# Patient Record
Sex: FEMALE | Race: NATIVE HAWAIIAN OR OTHER PACIFIC ISLANDER
[De-identification: names, ages, dates, MRNs, and addresses within clinical notes are randomized per-mention and may not be internally consistent; named-entity substitution may affect disease eponyms.]

---

## 2017-03-04 ENCOUNTER — HOSPITAL ENCOUNTER (EMERGENCY)
Dept: HOSPITAL 67 - ED | Age: 47
Discharge: HOME | End: 2017-03-04
Payer: COMMERCIAL

## 2017-03-04 VITALS — TEMPERATURE: 98 F

## 2017-03-04 VITALS — WEIGHT: 250 LBS | BODY MASS INDEX: 41.65 KG/M2 | HEIGHT: 65 IN

## 2017-03-04 VITALS — SYSTOLIC BLOOD PRESSURE: 158 MMHG | DIASTOLIC BLOOD PRESSURE: 88 MMHG

## 2017-03-04 DIAGNOSIS — R56.9: Primary | ICD-10-CM

## 2017-03-04 DIAGNOSIS — F12.10: ICD-10-CM

## 2017-03-04 DIAGNOSIS — F19.10: ICD-10-CM

## 2017-03-04 LAB
PLATELET # BLD: 271 K/UL (ref 152–353)
POTASSIUM SERPL-SCNC: 3.3 MMOL/L (ref 3.6–5.2)
SODIUM SERPL-SCNC: 137 MMOL/L (ref 136–145)

## 2017-03-04 PROCEDURE — 36415 COLL VENOUS BLD VENIPUNCTURE: CPT

## 2017-03-04 PROCEDURE — 81000 URINALYSIS NONAUTO W/SCOPE: CPT

## 2017-03-04 PROCEDURE — 99284 EMERGENCY DEPT VISIT MOD MDM: CPT

## 2017-03-04 PROCEDURE — 96374 THER/PROPH/DIAG INJ IV PUSH: CPT

## 2017-03-04 PROCEDURE — 96361 HYDRATE IV INFUSION ADD-ON: CPT

## 2017-03-04 PROCEDURE — 96376 TX/PRO/DX INJ SAME DRUG ADON: CPT

## 2017-03-04 PROCEDURE — 85027 COMPLETE CBC AUTOMATED: CPT

## 2017-03-04 PROCEDURE — 80053 COMPREHEN METABOLIC PANEL: CPT

## 2017-03-04 PROCEDURE — G0479 DRUG TEST PRESUMP NOT OPT: HCPCS

## 2017-03-04 PROCEDURE — 80307 DRUG TEST PRSMV CHEM ANLYZR: CPT

## 2017-09-08 ENCOUNTER — HOSPITAL ENCOUNTER (EMERGENCY)
Dept: HOSPITAL 24 - ER | Age: 47
Discharge: HOME | End: 2017-09-08
Payer: COMMERCIAL

## 2017-09-08 ENCOUNTER — HOSPITAL ENCOUNTER (OUTPATIENT)
Dept: HOSPITAL 67 - AMB | Age: 47
Discharge: ANOTHER HOSPITAL | End: 2017-09-08
Payer: COMMERCIAL

## 2017-09-08 VITALS — BODY MASS INDEX: 35.5 KG/M2

## 2017-09-08 VITALS — DIASTOLIC BLOOD PRESSURE: 78 MMHG | SYSTOLIC BLOOD PRESSURE: 175 MMHG

## 2017-09-08 DIAGNOSIS — R41.82: Primary | ICD-10-CM

## 2017-09-08 DIAGNOSIS — G43.909: Primary | ICD-10-CM

## 2017-09-08 LAB
ALBUMIN SERPL BCP-MCNC: 3.8 G/DL (ref 3.4–5)
ALP SERPL-CCNC: 112 UNITS/L (ref 46–116)
ALT SERPL W P-5'-P-CCNC: 24 UNITS/L (ref 12–78)
AMORPH SED URNS QL MICRO: (no result) /HPF
AST SERPL W P-5'-P-CCNC: 18 UNITS/L (ref 15–37)
BACTERIA URNS QL MICRO: NEGATIVE /HPF
BASOPHILS # BLD AUTO: 0.1 X10^3/UL (ref 0–0.1)
BASOPHILS NFR BLD AUTO: 0.7 % (ref 0.2–1)
BILIRUB UR QL STRIP.AUTO: NEGATIVE
BUN SERPL-MCNC: 11 MG/DL (ref 7–18)
CALCIUM ALBUM COR SERPL-SCNC: (no result) MG/DL (ref 8.5–10.1)
CALCIUM ALBUM COR SERPL-SCNC: 144 MMOL/L (ref 136–145)
CALCIUM SERPL-MCNC: 8.1 MG/DL (ref 8.5–10.1)
CHLORIDE SERPL-SCNC: 104 MMOL/L (ref 98–107)
CLARITY UR: CLEAR
CO2 SERPL-SCNC: 29 MMOL/L (ref 21–32)
COLOR UR AUTO: (no result)
CREAT SERPL-MCNC: 0.81 MG/DL (ref 0.55–1.02)
EGFR  BLACK RACES: > 60 (ref 60–?)
EOSINOPHIL # BLD AUTO: 0.3 X10^3/UL (ref 0–0.2)
EOSINOPHIL NFR BLD AUTO: 2.6 % (ref 0.9–2.9)
ERYTHROCYTE [DISTWIDTH] IN BLOOD BY AUTOMATED COUNT: 13.8 % (ref 11.6–16.5)
GLUCOSE UR QL STRIP.AUTO: (no result)
HCT VFR BLD AUTO: 47.3 % (ref 36–47)
HGB BLD-MCNC: 15.9 G/DL (ref 12–16)
KETONES UR QL STRIP.AUTO: NEGATIVE
LEUKOCYTE ESTERASE UR QL STRIP.AUTO: NEGATIVE
LYMPHOCYTES # BLD AUTO: 2.7 X10^3/UL (ref 1.3–2.9)
LYMPHOCYTES NFR BLD AUTO: 23.5 % (ref 21–51)
MCH RBC QN AUTO: 30.3 PG (ref 27–34)
MCHC RBC AUTO-ENTMCNC: 33.6 G/DL (ref 33–35)
MCV RBC AUTO: 90.1 FL (ref 80–100)
MONOCYTES # BLD AUTO: 0.4 X10^3/UL (ref 0.3–0.8)
MONOCYTES NFR BLD AUTO: 3.8 % (ref 0–13)
NEUTROPHILS # BLD AUTO: 7.9 X10^3/UL (ref 2.2–4.8)
NEUTROPHILS NFR BLD AUTO: 69.4 % (ref 42–75)
NITRITE UR QL STRIP.AUTO: NEGATIVE
PH UR STRIP.AUTO: 6.5 [PH] (ref 5–8)
PLATELET # BLD: 238 X10^3/UL (ref 150–450)
PMV BLD AUTO: 8.8 FL (ref 7.4–11)
PROT SERPL-MCNC: 8 G/DL (ref 6.4–8.2)
PROT UR QL STRIP.AUTO: (no result)
RBC # BLD AUTO: 5.25 X10^6/UL (ref 3.5–5.4)
RBC # UR STRIP.AUTO: (no result) /UL
RBC #/AREA URNS HPF: (no result) /HPF
SODIUM SERPL-SCNC: 142 MMOL/L (ref 136–145)
SP GR UR STRIP.AUTO: 1.01 (ref 1–1.03)
SQUAMOUS URNS QL MICRO: (no result) /HPF
UROBILINOGEN UR QL STRIP.AUTO: NORMAL
WBC NRBC COR # BLD AUTO: 11.4 X10^3/UL (ref 3.6–10)

## 2017-09-08 PROCEDURE — 96365 THER/PROPH/DIAG IV INF INIT: CPT

## 2017-09-08 PROCEDURE — A0425 GROUND MILEAGE: HCPCS

## 2017-09-08 PROCEDURE — 80053 COMPREHEN METABOLIC PANEL: CPT

## 2017-09-08 PROCEDURE — S0078 INJECTION, FOSPHENYTOIN SODI: HCPCS

## 2017-09-08 PROCEDURE — A0427 ALS1-EMERGENCY: HCPCS

## 2017-09-08 PROCEDURE — 36415 COLL VENOUS BLD VENIPUNCTURE: CPT

## 2017-09-08 PROCEDURE — 96375 TX/PRO/DX INJ NEW DRUG ADDON: CPT

## 2017-09-08 PROCEDURE — 51702 INSERT TEMP BLADDER CATH: CPT

## 2017-09-08 PROCEDURE — 80185 ASSAY OF PHENYTOIN TOTAL: CPT

## 2017-09-08 PROCEDURE — 93041 RHYTHM ECG TRACING: CPT

## 2017-09-08 PROCEDURE — 85025 COMPLETE CBC W/AUTO DIFF WBC: CPT

## 2017-09-08 PROCEDURE — 81001 URINALYSIS AUTO W/SCOPE: CPT

## 2017-09-08 PROCEDURE — 70450 CT HEAD/BRAIN W/O DYE: CPT

## 2017-09-08 PROCEDURE — 99283 EMERGENCY DEPT VISIT LOW MDM: CPT

## 2017-09-08 PROCEDURE — 96374 THER/PROPH/DIAG INJ IV PUSH: CPT

## 2017-09-08 NOTE — DR.SEIZA
HPI





- Time Seen


Time seen: 11:00





- Complaints


Chief Complaint Doctors Comments: Patient presented to the ED via EMS with 

complaint of seizure.  She had a tonic clonic seizure duration of less than one 

minute.  She received versed x2.Upon arrival patient was post-ictal.  Patient 

had another seizure duration of three minutes, tonic/clonic , received 1mg 

lorazepam and seizure stopped. While awaiting for information for registering 

patient she had another seizure and she was started on cerebyx 1gm. Family 

member reports that seizures started 1-2 years ago, no regular physician, last 

seizure one month ago.





PMH





- PMH


Past Medical History: Hypertension, Seizures


Past Surgical History: No





- Family History


Family Medical History: Diabetes Mellitus, Heart Failure, Hypertension





- Social History


Do you use any recreational Drugs:: No





ROS





- Review of Systems


Eyes: No Symptoms Reported


ENTM: No Symptoms Reported


Respiratoy: No Symptoms Reported, Other (post-ictal)


Cardiovascular: No Symptoms Reported


Gastrointestinal/Abdominal: No Symptoms Reported


Genitourinary: No Symptoms Reported


Neurological: Seizure


Musculoskeletal: No Symptoms Reported


Integumentary: No Symptoms Reported


Hematologic/Lymphatic: No Symptoms Reported


Endocrine: No Symptoms Reported


Psychiatric: No Symptoms Reported


All Other Systems: Reviewed and Negative





PE





- Vital Signs


Vitals: 


 





Pulse Rate [Right Radial]        87


Pulse Rate                       118


Respiratory Rate                 26


Blood Pressure [Right Arm]       175/78


Blood Pressure                   253/124


O2 Sat by Pulse Oximetry         96











- Head


Head Exam: Normal Inspection





- Eyes


Eye exam: Normal Appearance





- ENT


ENT Exam: Normal Exam


Mouth Exam: Normal Inspection





- Neck


Neck Exam: Normal Inspection, Full ROM





- Chest


Chest Inspection: Normal Inspection





- Respiratory


Respiratory Exam: Normal Lung Sounds Bilat


Respiratory Exam: Bilateral Clear to Auscultation





- Cardiovascular


Cardiovascular Exam: Regular Rate, Normal Rhythm





- Abdominal Exam


Abdominal Exam: Normal Inspection


Abdominal Tenderness: negative: RUQ, RLQ, LUQ, LLQ, Epigastrium, Suprapubic, 

Diffuse, Mild, Moderate, Severe, Other





- Extremities


Extremities Exam: Normal Inspection





- Back


Back Exam: Normal Inspection, Full ROM





- Neurologic


Neurological Exam: Alert, Oriented X3, CN II-XII Intact





- Psychiatric


Psychiatric Exam: Depressed





- Skin


Skin Exam: Warm, Dry, Intact





ROR





- Labs Reviewed


Result Diagrams: 


 09/08/17 11:37





 09/08/17 11:37


Laboratory: 


 











WBC  11.4 X10^3/uL (3.6-10.0)  H  09/08/17  11:37    


 


RBC  5.25 X10^6/uL (3.5-5.4)   09/08/17  11:37    


 


Hgb  15.9 g/dL (12.0-16.0)   09/08/17  11:37    


 


Hct  47.3 % (36.0-47.0)  H  09/08/17  11:37    


 


MCV  90.1 fL (80.0-100.0)   09/08/17  11:37    


 


MCH  30.3 pg (27.0-34.0)   09/08/17  11:37    


 


MCHC  33.6 g/dL (33.0-35.0)   09/08/17  11:37    


 


RDW  13.8 % (11.6-16.5)   09/08/17  11:37    


 


Plt Count  238 X10^3/uL (150.0-450.0)   09/08/17  11:37    


 


MPV  8.8 fL (7.4-11.0)   09/08/17  11:37    


 


Neut %  69.4 % (42.0-75.0)   09/08/17  11:37    


 


Lymph %  23.5 % (21.0-51.0)   09/08/17  11:37    


 


Mono %  3.8 % (0.0-13.0)   09/08/17  11:37    


 


Eos %  2.6 % (0.9-2.9)   09/08/17  11:37    


 


Baso %  0.7 % (0.2-1.0)   09/08/17  11:37    


 


Neut #  7.9 x10^3/uL (2.2-4.8)  H  09/08/17  11:37    


 


Lymph #  2.7 X10^3/uL (1.3-2.9)   09/08/17  11:37    


 


Mono #  0.4 x10^3/uL (0.3-0.8)   09/08/17  11:37    


 


Eos #  0.3 x10^3/uL (0.0-0.2)  H  09/08/17  11:37    


 


Baso #  0.1 X10^3/uL (0.0-0.1)   09/08/17  11:37    


 


Absolute Nucleated RBC  0.1 /100WBC  09/08/17  11:37    


 


Sodium  142 mmol/L (136-145)   09/08/17  11:37    


 


Corrected Sodium  144 mmol/L (136-145)   09/08/17  11:37    


 


Potassium  3.9 mmol/L (3.5-5.1)   09/08/17  11:37    


 


Chloride  104 mmol/L ()   09/08/17  11:37    


 


Carbon Dioxide  29.0 mmol/L (21-32)   09/08/17  11:37    


 


BUN  11 mg/dL (7-18)   09/08/17  11:37    


 


Creatinine  0.81 mg/dL (0.55-1.02)   09/08/17  11:37    


 


Est GFR (MDRD) Af Amer  > 60  (>60)   09/08/17  11:37    


 


Est GFR (MDRD) Non-Af  > 60  (>60)   09/08/17  11:37    


 


Glucose  199 mg/dL (65-99)  H  09/08/17  11:37    


 


Calcium  8.1 mg/dL (8.5-10.1)  L  09/08/17  11:37    


 


Corrected Calcium  TNP   09/08/17  11:37    


 


Total Bilirubin  0.20 mg/dL (0.2-1.0)   09/08/17  11:37    


 


AST  18 Units/L (15-37)   09/08/17  11:37    


 


ALT  24 Units/L (12-78)   09/08/17  11:37    


 


Alkaline Phosphatase  112 Units/L ()   09/08/17  11:37    


 


Total Protein  8.0 g/dL (6.4-8.2)   09/08/17  11:37    


 


Albumin  3.8 g/dL (3.4-5.0)   09/08/17  11:37    


 


Globulin  4.2 g/dL (2.5-4.5)   09/08/17  11:37    


 


Albumin/Globulin Ratio  0.9 Ratio (1.1-2.1)  L  09/08/17  11:37    


 


Specimen Type  Catherized urine   09/08/17  13:14    


 


Urine Color  Straw  (YELLOW)   09/08/17  13:14    


 


Urine Appearance  Clear  (CLEAR)   09/08/17  13:14    


 


Urine pH  6.5  (5.0 - 8.0)   09/08/17  13:14    


 


Ur Specific Gravity  1.015  (1.000-1.030)   09/08/17  13:14    


 


Urine Protein  3+  (NEGATIVE)   09/08/17  13:14    


 


Urine Glucose (UA)  4+  (NEGATIVE)   09/08/17  13:14    


 


Urine Ketones  Negative  (NEGATIVE)   09/08/17  13:14    


 


Urine Occult Blood  2+  (NEGATIVE)   09/08/17  13:14    


 


Urine Nitrite  Negative  (NEGATIVE)   09/08/17  13:14    


 


Urine Bilirubin  Negative  (NEGATIVE)   09/08/17  13:14    


 


Urine Urobilinogen  Normal  (NORMAL)   09/08/17  13:14    


 


Ur Leukocyte Esterase  Negative  (NEGATIVE)   09/08/17  13:14    


 


Urine RBC  None seen /HPF (NEGATIVE)   09/08/17  13:14    


 


Urine WBC  None seen /HPF (NEGATIVE)   09/08/17  13:14    


 


Ur Squamous Epith Cells  Rare /HPF (NEGATIVE)   09/08/17  13:14    


 


Amorphous Sediment  Trace /HPF (NEGATIVE)   09/08/17  13:14    


 


Urine Bacteria  Negative /HPF (NEGATIVE)   09/08/17  13:14    


 


Ur Culture Indicated?  No/not indicated   09/08/17  13:14    


 


Phenytoin  10.8 ug/mL (10-20)   09/08/17  15:57    














- XRAY


XRAY Interpreted by: Radiologist (Brain Ct: No acute abnormality)





- Diagnosis


Discharge Problem: 


 Seizure disorder








- Discharge Plan


Condition: Stable





- Follow ups/Referrals


Follow ups/Referrals: 


[facility],None [Primary Care Provider] - 3 days





- Instructions

## 2017-09-08 NOTE — CT
HISTORY: Seizures



Study:  CT brain without contrast



Comparison: July 16, 2015



Technique:

Multiple axial images of the brain were obtained from the skull base to the vertex without administra
tion of IV contrast. Sagittal and coronal reformations were provided.



Findings: 



No acute intraparenchymal hemorrhage or mass can be identified.  No extra-axial fluid collections are
 seen.  No alteration in the attenuation of the brain parenchyma can be identified to suggest acute o
r subacute ischemic change.  The ventricular system is symmetric and nondilated.  The extracranial st
ructures are grossly unremarkable.



IMPRESSION:

 

1.  No acute intracranial process can be identified.





Reported By:Electronically Signed by KENNETH DELA CRUZ MD at 9/8/2017 1:12:30 PM

## 2017-12-16 ENCOUNTER — HOSPITAL ENCOUNTER (EMERGENCY)
Dept: HOSPITAL 67 - ED | Age: 47
Discharge: HOME | End: 2017-12-16
Payer: COMMERCIAL

## 2017-12-16 ENCOUNTER — HOSPITAL ENCOUNTER (OUTPATIENT)
Dept: HOSPITAL 67 - AMB | Age: 47
Discharge: ANOTHER HOSPITAL | End: 2017-12-16
Payer: COMMERCIAL

## 2017-12-16 VITALS — DIASTOLIC BLOOD PRESSURE: 74 MMHG | SYSTOLIC BLOOD PRESSURE: 131 MMHG

## 2017-12-16 VITALS — HEIGHT: 64 IN | BODY MASS INDEX: 34.15 KG/M2 | WEIGHT: 200 LBS

## 2017-12-16 VITALS — TEMPERATURE: 97.5 F

## 2017-12-16 DIAGNOSIS — F12.90: ICD-10-CM

## 2017-12-16 DIAGNOSIS — R41.82: Primary | ICD-10-CM

## 2017-12-16 DIAGNOSIS — R56.9: Primary | ICD-10-CM

## 2017-12-16 DIAGNOSIS — R68.89: ICD-10-CM

## 2017-12-16 DIAGNOSIS — R56.9: ICD-10-CM

## 2017-12-16 LAB
PLATELET # BLD: 275 K/UL (ref 152–353)
POTASSIUM SERPL-SCNC: 3.8 MMOL/L (ref 3.6–5.2)
SODIUM SERPL-SCNC: 140 MMOL/L (ref 136–145)

## 2017-12-16 PROCEDURE — 99283 EMERGENCY DEPT VISIT LOW MDM: CPT

## 2017-12-16 PROCEDURE — 85027 COMPLETE CBC AUTOMATED: CPT

## 2017-12-16 PROCEDURE — 93005 ELECTROCARDIOGRAM TRACING: CPT

## 2017-12-16 PROCEDURE — 80053 COMPREHEN METABOLIC PANEL: CPT

## 2017-12-16 PROCEDURE — A0427 ALS1-EMERGENCY: HCPCS

## 2017-12-16 PROCEDURE — 0T9B70Z DRAINAGE OF BLADDER WITH DRAINAGE DEVICE, VIA NATURAL OR ARTIFICIAL OPENING: ICD-10-PCS | Performed by: GENERAL PRACTICE

## 2017-12-16 PROCEDURE — 84484 ASSAY OF TROPONIN QUANT: CPT

## 2017-12-16 PROCEDURE — G0479 DRUG TEST PRESUMP NOT OPT: HCPCS

## 2017-12-16 PROCEDURE — 80307 DRUG TEST PRSMV CHEM ANLYZR: CPT

## 2017-12-16 PROCEDURE — 82553 CREATINE MB FRACTION: CPT

## 2017-12-16 PROCEDURE — 36415 COLL VENOUS BLD VENIPUNCTURE: CPT

## 2017-12-16 PROCEDURE — 51702 INSERT TEMP BLADDER CATH: CPT

## 2017-12-16 PROCEDURE — A0425 GROUND MILEAGE: HCPCS

## 2017-12-16 PROCEDURE — 96374 THER/PROPH/DIAG INJ IV PUSH: CPT

## 2017-12-16 PROCEDURE — 82550 ASSAY OF CK (CPK): CPT

## 2017-12-16 PROCEDURE — 81000 URINALYSIS NONAUTO W/SCOPE: CPT

## 2018-05-08 ENCOUNTER — HOSPITAL ENCOUNTER (EMERGENCY)
Dept: HOSPITAL 67 - ED | Age: 48
Discharge: HOME | End: 2018-05-08
Payer: COMMERCIAL

## 2018-05-08 VITALS — SYSTOLIC BLOOD PRESSURE: 147 MMHG | DIASTOLIC BLOOD PRESSURE: 90 MMHG | TEMPERATURE: 98 F

## 2018-05-08 VITALS — BODY MASS INDEX: 35.49 KG/M2 | HEIGHT: 63 IN | WEIGHT: 200.31 LBS

## 2018-05-08 DIAGNOSIS — S61.211A: Primary | ICD-10-CM
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
## 2018-05-08 PROCEDURE — 99282 EMERGENCY DEPT VISIT SF MDM: CPT

## 2021-11-08 ENCOUNTER — HOSPITAL ENCOUNTER (INPATIENT)
Dept: HOSPITAL 67 - ED | Age: 51
LOS: 1 days | Discharge: HOME | DRG: 101 | End: 2021-11-09
Attending: INTERNAL MEDICINE | Admitting: INTERNAL MEDICINE
Payer: COMMERCIAL

## 2021-11-08 VITALS — DIASTOLIC BLOOD PRESSURE: 74 MMHG | SYSTOLIC BLOOD PRESSURE: 165 MMHG

## 2021-11-08 VITALS — SYSTOLIC BLOOD PRESSURE: 164 MMHG | DIASTOLIC BLOOD PRESSURE: 80 MMHG

## 2021-11-08 VITALS — SYSTOLIC BLOOD PRESSURE: 158 MMHG | TEMPERATURE: 98.7 F | DIASTOLIC BLOOD PRESSURE: 71 MMHG

## 2021-11-08 VITALS
DIASTOLIC BLOOD PRESSURE: 95 MMHG | SYSTOLIC BLOOD PRESSURE: 203 MMHG | HEIGHT: 66 IN | HEIGHT: 66 IN | TEMPERATURE: 97.8 F | WEIGHT: 226 LBS | BODY MASS INDEX: 36.32 KG/M2 | BODY MASS INDEX: 36.32 KG/M2 | WEIGHT: 226 LBS

## 2021-11-08 VITALS — SYSTOLIC BLOOD PRESSURE: 203 MMHG | DIASTOLIC BLOOD PRESSURE: 90 MMHG

## 2021-11-08 VITALS — SYSTOLIC BLOOD PRESSURE: 150 MMHG | TEMPERATURE: 98.8 F | DIASTOLIC BLOOD PRESSURE: 73 MMHG

## 2021-11-08 VITALS — SYSTOLIC BLOOD PRESSURE: 185 MMHG | DIASTOLIC BLOOD PRESSURE: 93 MMHG

## 2021-11-08 DIAGNOSIS — Z72.0: ICD-10-CM

## 2021-11-08 DIAGNOSIS — I10: ICD-10-CM

## 2021-11-08 DIAGNOSIS — G40.89: Primary | ICD-10-CM

## 2021-11-08 DIAGNOSIS — D72.828: ICD-10-CM

## 2021-11-08 DIAGNOSIS — E87.6: ICD-10-CM

## 2021-11-08 DIAGNOSIS — E66.8: ICD-10-CM

## 2021-11-08 LAB
PLATELET # BLD: 293 K/UL (ref 152–353)
POTASSIUM SERPL-SCNC: 3 MMOL/L (ref 3.6–5.2)
SODIUM SERPL-SCNC: 142 MMOL/L (ref 136–145)

## 2021-11-08 PROCEDURE — U0003 INFECTIOUS AGENT DETECTION BY NUCLEIC ACID (DNA OR RNA); SEVERE ACUTE RESPIRATORY SYNDROME CORONAVIRUS 2 (SARS-COV-2) (CORONAVIRUS DISEASE [COVID-19]), AMPLIFIED PROBE TECHNIQUE, MAKING USE OF HIGH THROUGHPUT TECHNOLOGIES AS DESCRIBED BY CMS-2020-01-R: HCPCS

## 2021-11-08 PROCEDURE — 96366 THER/PROPH/DIAG IV INF ADDON: CPT

## 2021-11-08 PROCEDURE — 96365 THER/PROPH/DIAG IV INF INIT: CPT

## 2021-11-08 PROCEDURE — 85027 COMPLETE CBC AUTOMATED: CPT

## 2021-11-08 PROCEDURE — 84484 ASSAY OF TROPONIN QUANT: CPT

## 2021-11-08 PROCEDURE — 87635 SARS-COV-2 COVID-19 AMP PRB: CPT

## 2021-11-08 PROCEDURE — 80307 DRUG TEST PRSMV CHEM ANLYZR: CPT

## 2021-11-08 PROCEDURE — 99284 EMERGENCY DEPT VISIT MOD MDM: CPT

## 2021-11-08 PROCEDURE — 80053 COMPREHEN METABOLIC PANEL: CPT

## 2021-11-08 PROCEDURE — 93005 ELECTROCARDIOGRAM TRACING: CPT

## 2021-11-08 PROCEDURE — 36415 COLL VENOUS BLD VENIPUNCTURE: CPT

## 2021-11-08 PROCEDURE — 96375 TX/PRO/DX INJ NEW DRUG ADDON: CPT

## 2021-11-08 PROCEDURE — 96360 HYDRATION IV INFUSION INIT: CPT

## 2021-11-08 PROCEDURE — 80048 BASIC METABOLIC PNL TOTAL CA: CPT

## 2021-11-08 NOTE — NUR
11/8/21 1600 PT TO ROOM 1129 DX RECURRENT SEIZURES,HTN NONCOMPLAINT.ALSO
HYPOKALEMIA.ORIENTED TO ROOM CALL LIGHT WITHIN REACH.CC

## 2021-11-09 VITALS — DIASTOLIC BLOOD PRESSURE: 83 MMHG | SYSTOLIC BLOOD PRESSURE: 134 MMHG | TEMPERATURE: 98.7 F

## 2021-11-09 VITALS — DIASTOLIC BLOOD PRESSURE: 99 MMHG | SYSTOLIC BLOOD PRESSURE: 143 MMHG | TEMPERATURE: 98.7 F

## 2021-11-09 VITALS — SYSTOLIC BLOOD PRESSURE: 143 MMHG | TEMPERATURE: 98.4 F | DIASTOLIC BLOOD PRESSURE: 65 MMHG

## 2021-11-09 LAB
PLATELET # BLD: 233 K/UL (ref 152–353)
POTASSIUM SERPL-SCNC: 3.4 MMOL/L (ref 3.6–5.2)
SODIUM SERPL-SCNC: 142 MMOL/L (ref 136–145)

## 2021-11-09 NOTE — NUR
11/9/21 1300 PT TOOK BATH AND WASHED HAIR PRIOR TO TEST EEG.
11/9/21 1320 SALINE LOCK REMOVED TO LT AC WAS LEAKING AND BLOODY APPLIED 2X2
SECURED WITH TAPE.CC
11/9/21 1324 PT TAKEN TO RESP FOR EEG VIA STRETCHER.CC

## 2021-11-09 NOTE — NUR
New Admission:
 
66 inches and IBW = 130+/+/-10% (117 to 143 lbs.) and kcal needs for IBW x 25
= 1500, x 30 = 1800, x 35 = 2200, x 40 = 2400 kcal/day, protein needs x .8 to
1.5 = 47 to 89 grams per day and fluids for IBW x 25 to 40 = 1500 to 2400
ml/cc per day and is 174% of IBW and BMI at 36.47 and is Class II Obesity and
has diagnosis of conversion d/o with seizures, HTN, non-compliant, and is a
51YOF, 2 gm na diet plan, hypokalemia, HA, sprain to shoulder, abd. migraine,
gallstones, drug abuse, marijuana abuse, elevated troponin, K 3.4 depressed
along with alt at 2.1 and gl 127 elevtaed, alb 3.1 depressed, obesity and
tobacco abuse and ate 100% of meals.
 
RD Recommendations:
1-OT and Pt to work with the patient as needed.
2-MOnitor labs
3-Add NCS HIgh fiber to diet plan
4-If will not follow the diet as ordered please get a dietary refusal form
signed and please in the EMR
5-Make sure hydrated
6-May want to add K foods with trays as a banana or oranges
7-Stress HBV protein foods-Meats, eggs
RD available as needed

## 2021-11-09 NOTE — NUR
11/9/21 1440 DSICHARGE INSTRUCTIONS GIVEN AND SIGNED.ALSO RX X2 TO
PATIENT.TOOK OUT VIA WHEELCHAIR TO [facility].CC

## 2021-11-09 NOTE — NUR
Pt resting in bed with eyes closed at this time. No seizure activity observed.
Pt has been alert and oriented x4 during hs med pass. SL to RAC without
redness/swelling. Keppra IV given as ordered without adverse reaction noted.
Pt ambulates to bathroom unassisted with staff standing by. No s/s of distress
noted. Call light in easy reach.

## 2023-02-18 ENCOUNTER — HOSPITAL ENCOUNTER (EMERGENCY)
Dept: HOSPITAL 67 - ED | Age: 53
Discharge: LEFT BEFORE DISCHARGE | End: 2023-02-18
Payer: COMMERCIAL

## 2023-02-18 VITALS — WEIGHT: 250 LBS | BODY MASS INDEX: 40.18 KG/M2 | HEIGHT: 66 IN

## 2023-02-18 VITALS — TEMPERATURE: 97.8 F

## 2023-02-18 VITALS — DIASTOLIC BLOOD PRESSURE: 81 MMHG | SYSTOLIC BLOOD PRESSURE: 162 MMHG

## 2023-02-18 DIAGNOSIS — F17.210: ICD-10-CM

## 2023-02-18 DIAGNOSIS — G40.909: Primary | ICD-10-CM

## 2023-02-18 DIAGNOSIS — I10: ICD-10-CM

## 2023-02-18 DIAGNOSIS — Z53.29: ICD-10-CM

## 2023-02-18 LAB
APTT BLD: 23.6 SECONDS (ref 24.5–33.6)
PLATELET # BLD: 237 K/UL (ref 152–353)
POTASSIUM SERPL-SCNC: 3 MMOL/L (ref 3.6–5.2)
SODIUM SERPL-SCNC: 141 MMOL/L (ref 136–145)

## 2023-02-18 PROCEDURE — 81025 URINE PREGNANCY TEST: CPT

## 2023-02-18 PROCEDURE — 80053 COMPREHEN METABOLIC PANEL: CPT

## 2023-02-18 PROCEDURE — 85610 PROTHROMBIN TIME: CPT

## 2023-02-18 PROCEDURE — 81002 URINALYSIS NONAUTO W/O SCOPE: CPT

## 2023-02-18 PROCEDURE — 96367 TX/PROPH/DG ADDL SEQ IV INF: CPT

## 2023-02-18 PROCEDURE — 82550 ASSAY OF CK (CPK): CPT

## 2023-02-18 PROCEDURE — 82542 COL CHROMOTOGRAPHY QUAL/QUAN: CPT

## 2023-02-18 PROCEDURE — 84484 ASSAY OF TROPONIN QUANT: CPT

## 2023-02-18 PROCEDURE — 96365 THER/PROPH/DIAG IV INF INIT: CPT

## 2023-02-18 PROCEDURE — 85027 COMPLETE CBC AUTOMATED: CPT

## 2023-02-18 PROCEDURE — 83735 ASSAY OF MAGNESIUM: CPT

## 2023-02-18 PROCEDURE — 80185 ASSAY OF PHENYTOIN TOTAL: CPT

## 2023-02-18 PROCEDURE — 93005 ELECTROCARDIOGRAM TRACING: CPT

## 2023-02-18 PROCEDURE — 99284 EMERGENCY DEPT VISIT MOD MDM: CPT

## 2023-02-18 PROCEDURE — 80307 DRUG TEST PRSMV CHEM ANLYZR: CPT

## 2023-02-18 PROCEDURE — 85730 THROMBOPLASTIN TIME PARTIAL: CPT
